# Patient Record
Sex: FEMALE | NOT HISPANIC OR LATINO | URBAN - METROPOLITAN AREA
[De-identification: names, ages, dates, MRNs, and addresses within clinical notes are randomized per-mention and may not be internally consistent; named-entity substitution may affect disease eponyms.]

---

## 2017-04-12 PROBLEM — I63.9 STROKE (HCC): Status: ACTIVE | Noted: 2017-04-12

## 2017-04-12 PROBLEM — R07.9 CHEST PAIN: Status: ACTIVE | Noted: 2017-04-12

## 2018-03-13 PROBLEM — R07.2 PRECORDIAL CHEST PAIN: Status: ACTIVE | Noted: 2018-03-13

## 2018-04-02 ENCOUNTER — IMPORTED ENCOUNTER (OUTPATIENT)
Dept: URBAN - METROPOLITAN AREA CLINIC 1 | Facility: CLINIC | Age: 68
End: 2018-04-02

## 2018-04-02 PROBLEM — H25.813: Noted: 2018-04-02

## 2018-04-02 PROBLEM — H16.143: Noted: 2018-04-02

## 2018-04-02 PROBLEM — H04.123: Noted: 2018-04-02

## 2018-04-02 PROBLEM — S00.202A: Noted: 2018-04-02

## 2018-04-02 PROCEDURE — 92002 INTRM OPH EXAM NEW PATIENT: CPT

## 2018-04-02 NOTE — PATIENT DISCUSSION
1.  Abrasion of Eyelid lateral canthus- Begin Erythromycin britt BID x5 days then d/c. (Pt has h/o Sulfa Allergy) Rx sent to patient's pharmacy. 2.  Cataract OU: Observe for now without intervention. The patient was advised to contact us if any change or worsening of vision3. ABDIEL w/ PEK OU- Use ATs BID OU Routinely. 4.  Patient reports hallucinations at night- Discussed with patient she does not have Star Ser Syndrome due to level of vision measuring 20/20 & 20/25. Patient also states she has had increased dosage of Wellbutrin. Discussed with patient to discuss with PCP further. Return for an appointment in 3 mo 30 with Dr. Rudolph Lindsey.

## 2018-08-17 ENCOUNTER — HOSPITAL ENCOUNTER (EMERGENCY)
Age: 68
Discharge: HOME | End: 2018-08-17
Attending: EMERGENCY MEDICINE
Payer: COMMERCIAL

## 2018-08-17 ENCOUNTER — APPOINTMENT (OUTPATIENT)
Dept: GENERAL RADIOLOGY | Age: 68
End: 2018-08-17
Attending: EMERGENCY MEDICINE
Payer: COMMERCIAL

## 2018-08-17 ENCOUNTER — APPOINTMENT (OUTPATIENT)
Dept: CT IMAGING | Age: 68
End: 2018-08-17
Attending: EMERGENCY MEDICINE
Payer: COMMERCIAL

## 2018-08-17 VITALS
OXYGEN SATURATION: 99 % | TEMPERATURE: 98.2 F | HEART RATE: 71 BPM | SYSTOLIC BLOOD PRESSURE: 119 MMHG | RESPIRATION RATE: 19 BRPM | DIASTOLIC BLOOD PRESSURE: 59 MMHG

## 2018-08-17 DIAGNOSIS — M54.2 NECK PAIN: ICD-10-CM

## 2018-08-17 DIAGNOSIS — R19.7 DIARRHEA, UNSPECIFIED TYPE: ICD-10-CM

## 2018-08-17 DIAGNOSIS — R55 SYNCOPE AND COLLAPSE: Primary | ICD-10-CM

## 2018-08-17 DIAGNOSIS — R11.2 NON-INTRACTABLE VOMITING WITH NAUSEA, UNSPECIFIED VOMITING TYPE: ICD-10-CM

## 2018-08-17 DIAGNOSIS — I77.819 AORTIC DILATATION (HCC): ICD-10-CM

## 2018-08-17 LAB
ALBUMIN SERPL-MCNC: 3.6 G/DL (ref 3.4–5)
ALBUMIN/GLOB SERPL: 1.1 {RATIO} (ref 0.8–1.7)
ALP SERPL-CCNC: 87 U/L (ref 45–117)
ALT SERPL-CCNC: 21 U/L (ref 13–56)
ANION GAP SERPL CALC-SCNC: 7 MMOL/L (ref 3–18)
AST SERPL-CCNC: 15 U/L (ref 15–37)
BASOPHILS # BLD: 0 K/UL (ref 0–0.1)
BASOPHILS NFR BLD: 0 % (ref 0–2)
BILIRUB SERPL-MCNC: 0.4 MG/DL (ref 0.2–1)
BUN SERPL-MCNC: 33 MG/DL (ref 7–18)
BUN/CREAT SERPL: 18 (ref 12–20)
CALCIUM SERPL-MCNC: 9 MG/DL (ref 8.5–10.1)
CHLORIDE SERPL-SCNC: 108 MMOL/L (ref 100–108)
CO2 SERPL-SCNC: 24 MMOL/L (ref 21–32)
CREAT SERPL-MCNC: 1.81 MG/DL (ref 0.6–1.3)
DIFFERENTIAL METHOD BLD: ABNORMAL
EOSINOPHIL # BLD: 0.4 K/UL (ref 0–0.4)
EOSINOPHIL NFR BLD: 3 % (ref 0–5)
ERYTHROCYTE [DISTWIDTH] IN BLOOD BY AUTOMATED COUNT: 13 % (ref 11.6–14.5)
GLOBULIN SER CALC-MCNC: 3.4 G/DL (ref 2–4)
GLUCOSE SERPL-MCNC: 138 MG/DL (ref 74–99)
HCT VFR BLD AUTO: 34.6 % (ref 35–45)
HGB BLD-MCNC: 11.9 G/DL (ref 12–16)
LYMPHOCYTES # BLD: 1.5 K/UL (ref 0.9–3.6)
LYMPHOCYTES NFR BLD: 14 % (ref 21–52)
MCH RBC QN AUTO: 29.2 PG (ref 24–34)
MCHC RBC AUTO-ENTMCNC: 34.4 G/DL (ref 31–37)
MCV RBC AUTO: 85 FL (ref 74–97)
MONOCYTES # BLD: 0.8 K/UL (ref 0.05–1.2)
MONOCYTES NFR BLD: 7 % (ref 3–10)
NEUTS SEG # BLD: 8.1 K/UL (ref 1.8–8)
NEUTS SEG NFR BLD: 76 % (ref 40–73)
PLATELET # BLD AUTO: 240 K/UL (ref 135–420)
PMV BLD AUTO: 10.1 FL (ref 9.2–11.8)
POTASSIUM SERPL-SCNC: 4.6 MMOL/L (ref 3.5–5.5)
PROT SERPL-MCNC: 7 G/DL (ref 6.4–8.2)
RBC # BLD AUTO: 4.07 M/UL (ref 4.2–5.3)
SODIUM SERPL-SCNC: 139 MMOL/L (ref 136–145)
TROPONIN I BLD-MCNC: <0.04 NG/ML (ref 0–0.08)
WBC # BLD AUTO: 10.8 K/UL (ref 4.6–13.2)

## 2018-08-17 PROCEDURE — 74011250637 HC RX REV CODE- 250/637: Performed by: EMERGENCY MEDICINE

## 2018-08-17 PROCEDURE — 71045 X-RAY EXAM CHEST 1 VIEW: CPT

## 2018-08-17 PROCEDURE — 96374 THER/PROPH/DIAG INJ IV PUSH: CPT

## 2018-08-17 PROCEDURE — 80053 COMPREHEN METABOLIC PANEL: CPT | Performed by: EMERGENCY MEDICINE

## 2018-08-17 PROCEDURE — 74011250636 HC RX REV CODE- 250/636: Performed by: EMERGENCY MEDICINE

## 2018-08-17 PROCEDURE — 99285 EMERGENCY DEPT VISIT HI MDM: CPT

## 2018-08-17 PROCEDURE — 74176 CT ABD & PELVIS W/O CONTRAST: CPT

## 2018-08-17 PROCEDURE — 93005 ELECTROCARDIOGRAM TRACING: CPT

## 2018-08-17 PROCEDURE — 84484 ASSAY OF TROPONIN QUANT: CPT

## 2018-08-17 PROCEDURE — 85025 COMPLETE CBC W/AUTO DIFF WBC: CPT | Performed by: EMERGENCY MEDICINE

## 2018-08-17 PROCEDURE — 70450 CT HEAD/BRAIN W/O DYE: CPT

## 2018-08-17 RX ORDER — ACETAMINOPHEN 500 MG
1000 TABLET ORAL
Status: COMPLETED | OUTPATIENT
Start: 2018-08-17 | End: 2018-08-17

## 2018-08-17 RX ORDER — ACETAMINOPHEN 500 MG
500 TABLET ORAL
Status: DISCONTINUED | OUTPATIENT
Start: 2018-08-17 | End: 2018-08-17

## 2018-08-17 RX ORDER — ONDANSETRON 2 MG/ML
4 INJECTION INTRAMUSCULAR; INTRAVENOUS
Status: COMPLETED | OUTPATIENT
Start: 2018-08-17 | End: 2018-08-17

## 2018-08-17 RX ADMIN — ONDANSETRON 4 MG: 2 INJECTION, SOLUTION INTRAMUSCULAR; INTRAVENOUS at 15:36

## 2018-08-17 RX ADMIN — ACETAMINOPHEN 1000 MG: 500 TABLET, FILM COATED ORAL at 15:36

## 2018-08-17 NOTE — ED PROVIDER NOTES
EMERGENCY DEPARTMENT HISTORY AND PHYSICAL EXAM    1:32 PM      Date: 8/17/2018  Patient Name: Kiah Nichole    History of Presenting Illness     No chief complaint on file. History Provided By: Patient and EMS    Additional History (Context): Kiah Nichole is a 75 y.o. female with PMHx of HTN, CAD, Repaired AAA, Hypercholesterolemia, IBS, and Gout who presents with a syncopal episode onset Today PTA. Patient states that she was in [place], and had the urge to urinate. After urinating, she began experiencing sharp, left-sided abdominal pain. States that when she got in the car, she noticed having blurred vision. States that she ate two doughnuts, and her symptoms progressively worsened. Reports that when she arrived to the [facility] [address] [facility], she was experiencing nausea, vomiting, and then had a syncopal episode while in the restroom. Patient was experiencing chills and diaphoresis upon EMS arrival. Patient also reported a choking sensation in her neck and chest. Patient was administered 324mg ASA and 4mg Zofran PTA. No other concerns were expressed at this time. PCP: Traci Toscano MD    Chief Complaint: Syncope   Duration:  Today PTA  Timing:  Acute  Location: Initially reported left-sided abdominal pain   Quality: Sharp  Severity: Severe  Modifying Factors: Patient was administered 324mg ASA and 4mg Zofran PTA. Associated Symptoms: Patient reports progressively worsening left-sided abdominal pain and blurred vision prior to the syncopal episode. Patient also reports N/V, chills, diaphoresis, and a choking sensation in her neck and chest.       Current Outpatient Prescriptions   Medication Sig Dispense Refill    carvedilol (COREG) 25 mg tablet Take 1 Tab by mouth two (2) times daily (with meals). 60 Tab 0    albuterol (PROVENTIL VENTOLIN) 2.5 mg /3 mL (0.083 %) nebulizer solution by Nebulization route once.  aspirin 81 mg chewable tablet Take 81 mg by mouth daily.       docusate sodium (COLACE) 100 mg capsule Take 100 mg by mouth as needed for Constipation.  isosorbide mononitrate ER (IMDUR) 30 mg tablet Take 30 mg by mouth daily.  LORazepam (ATIVAN) 1 mg tablet Take 1 mg by mouth every four (4) hours as needed for Anxiety.  nitroglycerin (NITROSTAT) 0.4 mg SL tablet 0.4 mg by SubLINGual route every five (5) minutes as needed for Chest Pain.  spironolactone (ALDACTONE) 25 mg tablet Take 25 mg by mouth daily.  lisinopril (PRINIVIL, ZESTRIL) 40 mg tablet Take 1 Tab by mouth daily. 30 Tab 0    buPROPion XL (WELLBUTRIN XL) 150 mg tablet Take 150 mg by mouth every morning.  rosuvastatin (CRESTOR) 40 mg tablet Take 40 mg by mouth nightly.  fenofibrate micronized (LOFIBRA) 134 mg capsule Take 134 mg by mouth every morning.  escitalopram oxalate (LEXAPRO) 10 mg tablet Take 10 mg by mouth daily.  allopurinol (ZYLOPRIM) 100 mg tablet Take 100 mg by mouth daily. Past History     Past Medical History:  Past Medical History:   Diagnosis Date    AAA (abdominal aortic aneurysm) ([facility] [address]     repaired    CAD (coronary artery disease)     Colitis     c-diff - resolved    Gout     Headache     Hypercholesteremia     Hypertension     IBS (irritable bowel syndrome)     Psychiatric disorder     situational depression and anxiety       Past Surgical History:  Past Surgical History:   Procedure Laterality Date    BREAST SURGERY PROCEDURE UNLISTED      left lumpectomy    COLONOSCOPY N/A 3/29/2018    COLONOSCOPY, SURVEILLANCE performed by Agata Jarquin MD at [address] [place] [address] HX AAA REPAIR      HX CHOLECYSTECTOMY      HX GYN      HX HYSTERECTOMY      HX KNEE ARTHROSCOPY Bilateral     HX OTHER SURGICAL      part of right kidney removed    HX THYROIDECTOMY      partial       Family History:  No family history on file.     Social History:  Social History   Substance Use Topics    Smoking status: Current Some Day Smoker    Smokeless tobacco: Never Used      Comment: smokes a pack a week    Alcohol use No       Allergies: Allergies   Allergen Reactions    Codeine Anxiety and Unknown (comments)     Other reaction(s): neurological reaction  nervous    Hydromorphone (Pf) Other (comments)     Mood swing    Sulfa (Sulfonamide Antibiotics) Swelling         Review of Systems     Review of Systems   Constitutional: Positive for chills and diaphoresis. Eyes:        (+) blurred vision     Respiratory: Positive for choking (\"choking sensation\" in chest and neck). Cardiovascular: Positive for chest pain. Gastrointestinal: Positive for abdominal pain, nausea and vomiting. Neurological: Positive for syncope. All other systems reviewed and are negative. Physical Exam   There were no vitals taken for this visit. Physical Exam   Constitutional: She is oriented to person, place, and time. She appears well-developed. HENT:   Head: Normocephalic and atraumatic. Eyes: EOM are normal. Pupils are equal, round, and reactive to light. Neck: Normal range of motion. Neck supple. Cardiovascular: Normal rate, regular rhythm and normal heart sounds. Exam reveals no friction rub. No murmur heard. Pulmonary/Chest: Effort normal and breath sounds normal. No respiratory distress. She has no wheezes. Abdominal: Soft. She exhibits no distension. There is no tenderness. There is no rebound and no guarding. Musculoskeletal: Normal range of motion. Neurological: She is alert and oriented to person, place, and time. Skin: Skin is warm and dry. Psychiatric: She has a normal mood and affect. Her behavior is normal. Thought content normal.       Diagnostic Study Results       EKG shows sinus at 62 with a normal axis normal intervals there is no ST elevation or depression or hypertrophy.     Medical Decision Making       Patient was driving she started feeling very lightheaded like she was going to pass out and became diaphoretic is worsened she then developed some abdominal pain.  She was able to get into the bathroom where she then had vomiting and diarrhea at the same time reportedly passed out in the bathroom and presents now she does have some mild neck pain she has had trouble feeling like she cannot swallow well for about a month. CT head and CT abdomen and pelvis noted. She did not have symptoms consistent with rupture with sudden onset severe abdominal pain first started to feel like she was going to pass out and then developed abdominal pain she also had associated vomiting and diarrhea. We will recheck troponin likely referral to spine surgery for her neck pain I do not think she had a ruptured aneurysm she has no headache. Her PCP as well. Ct read on ao noted; no cp;     xr neg. Reviewed ct with dr Khang Clements; outpt imaging ok. poc torp ne:27 PM   Pt with neck pain x1 month; will refer to spine. Diagnosis     No diagnosis found. _______________________________    Attestations:  Scribe Attestation     5 Yanna Hermosillo acting as a scribe for and in the presence of Kaitlyn Vanessa MD      2018 at 1:32 PM       Provider Attestation:      I personally performed the services described in the documentation, reviewed the documentation, as recorded by the scribe in my presence, and it accurately and completely records my words and actions.  2018 at 1:32 PM - Kiatlyn Vanessa MD    _______________________________

## 2018-08-17 NOTE — DISCHARGE INSTRUCTIONS
Back Pain: Care Instructions  Your Care Instructions    Back pain has many possible causes. It is often related to problems with muscles and ligaments of the back. It may also be related to problems with the nerves, discs, or bones of the back. Moving, lifting, standing, sitting, or sleeping in an awkward way can strain the back. Sometimes you don't notice the injury until later. Arthritis is another common cause of back pain. Although it may hurt a lot, back pain usually improves on its own within several weeks. Most people recover in 12 weeks or less. Using good home treatment and being careful not to stress your back can help you feel better sooner. Follow-up care is a key part of your treatment and safety. Be sure to make and go to all appointments, and call your doctor if you are having problems. It's also a good idea to know your test results and keep a list of the medicines you take. How can you care for yourself at home? · Sit or lie in positions that are most comfortable and reduce your pain. Try one of these positions when you lie down:  ¨ Lie on your back with your knees bent and supported by large pillows. ¨ Lie on the floor with your legs on the seat of a sofa or chair. Majel Prophet on your side with your knees and hips bent and a pillow between your legs. ¨ Lie on your stomach if it does not make pain worse. · Do not sit up in bed, and avoid soft couches and twisted positions. Bed rest can help relieve pain at first, but it delays healing. Avoid bed rest after the first day of back pain. · Change positions every 30 minutes. If you must sit for long periods of time, take breaks from sitting. Get up and walk around, or lie in a comfortable position. · Try using a heating pad on a low or medium setting for 15 to 20 minutes every 2 or 3 hours. Try a warm shower in place of one session with the heating pad. · You can also try an ice pack for 10 to 15 minutes every 2 to 3 hours.  Put a thin cloth between the ice pack and your skin. · Take pain medicines exactly as directed. ¨ If the doctor gave you a prescription medicine for pain, take it as prescribed. ¨ If you are not taking a prescription pain medicine, ask your doctor if you can take an over-the-counter medicine. · Take short walks several times a day. You can start with 5 to 10 minutes, 3 or 4 times a day, and work up to longer walks. Walk on level surfaces and avoid hills and stairs until your back is better. · Return to work and other activities as soon as you can. Continued rest without activity is usually not good for your back. · To prevent future back pain, do exercises to stretch and strengthen your back and stomach. Learn how to use good posture, safe lifting techniques, and proper body mechanics. When should you call for help? Call your doctor now or seek immediate medical care if:    · You have new or worsening numbness in your legs.     · You have new or worsening weakness in your legs. (This could make it hard to stand up.)     · You lose control of your bladder or bowels.    Watch closely for changes in your health, and be sure to contact your doctor if:    · You have a fever, lose weight, or don't feel well.     · You do not get better as expected. Where can you learn more? Go to http://eve-lizbeth.info/. Enter U390 in the search box to learn more about \"Back Pain: Care Instructions. \"  Current as of: November 29, 2017  Content Version: 11.7  © 9464-6870 [employer]. Care instructions adapted under license by [employer] (which disclaims liability or warranty for this information). If you have questions about a medical condition or this instruction, always ask your healthcare professional. [place] [address] any warranty or liability for your use of this information.          Neck Pain: Care Instructions  Your Care Instructions    You can have neck pain anywhere from the bottom of your head to the top of your shoulders. It can spread to the upper back or arms. Injuries, painting a ceiling, sleeping with your neck twisted, staying in one position for too long, and many other activities can cause neck pain. Most neck pain gets better with home care. Your doctor may recommend medicine to relieve pain or relax your muscles. He or she may suggest exercise and physical therapy to increase flexibility and relieve stress. You may need to wear a special (cervical) collar to support your neck for a day or two. Follow-up care is a key part of your treatment and safety. Be sure to make and go to all appointments, and call your doctor if you are having problems. It's also a good idea to know your test results and keep a list of the medicines you take. How can you care for yourself at home? · Try using a heating pad on a low or medium setting for 15 to 20 minutes every 2 or 3 hours. Try a warm shower in place of one session with the heating pad. · You can also try an ice pack for 10 to 15 minutes every 2 to 3 hours. Put a thin cloth between the ice and your skin. · Take pain medicines exactly as directed. ¨ If the doctor gave you a prescription medicine for pain, take it as prescribed. ¨ If you are not taking a prescription pain medicine, ask your doctor if you can take an over-the-counter medicine. · If your doctor recommends a cervical collar, wear it exactly as directed. When should you call for help? Call your doctor now or seek immediate medical care if:    · You have new or worsening numbness in your arms, buttocks or legs.     · You have new or worsening weakness in your arms or legs.  (This could make it hard to stand up.)     · You lose control of your bladder or bowels.    Watch closely for changes in your health, and be sure to contact your doctor if:    · Your neck pain is getting worse.     · You are not getting better after 1 week.     · You do not get better as expected. Where can you learn more? Go to http://eve-lizbeth.info/. Enter [id number] in the search box to learn more about \"Neck Pain: Care Instructions. \"  Current as of: November 29, 2017  Content Version: 11.7  © 8972-8302 [employer]. Care instructions adapted under license by [employer] (which disclaims liability or warranty for this information). If you have questions about a medical condition or this instruction, always ask your healthcare professional. [place] [address] any warranty or liability for your use of this information. Fainting: Care Instructions  Your Care Instructions    When you faint, or pass out, you lose consciousness for a short time. A brief drop in blood flow to the brain often causes it. When you fall or lie down, more blood flows to your brain and you regain consciousness. Emotional stress, pain, or overheating-especially if you have been standing-can make you faint. In these cases, fainting is usually not serious. But fainting can be a sign of a more serious problem. Your doctor may want you to have more tests to rule out other causes. The treatment you need depends on the reason why you fainted. The doctor has checked you carefully, but problems can develop later. If you notice any problems or new symptoms, get medical treatment right away. Follow-up care is a key part of your treatment and safety. Be sure to make and go to all appointments, and call your doctor if you are having problems. It's also a good idea to know your test results and keep a list of the medicines you take. How can you care for yourself at home? · Drink plenty of fluids to prevent dehydration. If you have kidney, heart, or liver disease and have to limit fluids, talk with your doctor before you increase your fluid intake. When should you call for help? Call 911 anytime you think you may need emergency care.  For example, call if:    · You have symptoms of a heart problem. These may include:  ¨ Chest pain or pressure. ¨ Severe trouble breathing. ¨ A fast or irregular heartbeat. ¨ Lightheadedness or sudden weakness. ¨ Coughing up pink, foamy mucus. ¨ Passing out. After you call 911, the  may tell you to chew 1 adult-strength or 2 to 4 low-dose aspirin. Wait for an ambulance. Do not try to drive yourself.     · You have symptoms of a stroke. These may include:  ¨ Sudden numbness, tingling, weakness, or loss of movement in your face, arm, or leg, especially on only one side of your body. ¨ Sudden vision changes. ¨ Sudden trouble speaking. ¨ Sudden confusion or trouble understanding simple statements. ¨ Sudden problems with walking or balance. ¨ A sudden, severe headache that is different from past headaches.     · You passed out (lost consciousness) again.    Watch closely for changes in your health, and be sure to contact your doctor if:    · You do not get better as expected. Where can you learn more? Go to http://eve-lizbeth.info/. Enter C312 in the search box to learn more about \"Fainting: Care Instructions. \"  Current as of: November 20, 2017  Content Version: 11.7  © 5838-4962 [employer]. Care instructions adapted under license by [employer] (which disclaims liability or warranty for this information). If you have questions about a medical condition or this instruction, always ask your healthcare professional. [place] [address] any warranty or liability for your use of this information.

## 2018-08-18 LAB
ATRIAL RATE: 62 BPM
CALCULATED P AXIS, ECG09: 55 DEGREES
CALCULATED R AXIS, ECG10: 33 DEGREES
CALCULATED T AXIS, ECG11: 31 DEGREES
DIAGNOSIS, 93000: NORMAL
P-R INTERVAL, ECG05: 158 MS
Q-T INTERVAL, ECG07: 454 MS
QRS DURATION, ECG06: 74 MS
QTC CALCULATION (BEZET), ECG08: 460 MS
VENTRICULAR RATE, ECG03: 62 BPM

## 2018-10-24 PROBLEM — M48.00 SPINAL STENOSIS: Status: ACTIVE | Noted: 2018-10-24

## 2019-08-19 ENCOUNTER — IMPORTED ENCOUNTER (OUTPATIENT)
Dept: URBAN - METROPOLITAN AREA CLINIC 1 | Facility: CLINIC | Age: 69
End: 2019-08-19

## 2019-08-19 PROBLEM — H16.143: Noted: 2019-08-19

## 2019-08-19 PROBLEM — H25.813: Noted: 2019-08-19

## 2019-08-19 PROBLEM — H04.123: Noted: 2019-08-19

## 2019-08-19 PROBLEM — D48.9: Noted: 2019-08-19

## 2019-08-19 PROCEDURE — 92014 COMPRE OPH EXAM EST PT 1/>: CPT

## 2019-08-19 NOTE — PATIENT DISCUSSION
1.  Cataract OU: Visually significant Observe for now without intervention. The patient was advised to contact us if any change or worsening of vision. **Will re evaluate after mass excision. 2.  RLL Mass of uncertain behavior- Plan excisional biopsy with pathology for diagnostic purposes. Will not obtain complete margins given proximity to lower punctum. Patient understands a second surgery may be required if path returns malignant. 3.  ABDIEL w/ PEK OU- Recommend ATs TID OU routinelyReturn for an appointment in excision biopsy w/ path RLL with Dr. Dunia Fletcher.

## 2019-08-19 NOTE — PATIENT DISCUSSION
RLL Mass of uncertain behavior- Plan excisional biopsy with pathology for diagnostic purposes.  Will

## 2019-08-23 ENCOUNTER — IMPORTED ENCOUNTER (OUTPATIENT)
Dept: URBAN - METROPOLITAN AREA CLINIC 1 | Facility: CLINIC | Age: 69
End: 2019-08-23

## 2019-08-23 NOTE — PATIENT DISCUSSION
1. RLL Mass of uncertain behavior- Plan excisional biopsy with pathology for diagnostic purposes. Will not obtain complete margins given proximity to lower punctum. Patient understands a second surgery may be required if path returns malignant. Pros cons risks and benefits discussed. Use Emycin britt QHS to RLL x10 days po then d/c.  (Rx sent to patient's pharm) F/u as scheduled

## 2019-08-26 PROBLEM — D48.9: Noted: 2019-08-26

## 2019-08-28 ENCOUNTER — IMPORTED ENCOUNTER (OUTPATIENT)
Dept: URBAN - METROPOLITAN AREA CLINIC 1 | Facility: CLINIC | Age: 69
End: 2019-08-28

## 2019-08-28 PROBLEM — D48.9 NEOPLASM OF UNCERTAIN BEHAVIOR: Status: ACTIVE | Noted: 2019-08-28

## 2019-08-28 PROBLEM — D48.9 NEOPLASM OF UNCERTAIN BEHAVIOR: Status: RESOLVED | Noted: 2019-08-28 | Resolved: 2019-08-28

## 2019-09-03 ENCOUNTER — IMPORTED ENCOUNTER (OUTPATIENT)
Dept: URBAN - METROPOLITAN AREA CLINIC 1 | Facility: CLINIC | Age: 69
End: 2019-09-03

## 2019-09-03 PROCEDURE — 99024 POSTOP FOLLOW-UP VISIT: CPT

## 2019-09-03 NOTE — PATIENT DISCUSSION
1.  S/p Excision RLL Mass of uncertain behavior - Healing cont Eyrthromycin britt BID RLL until gone. Return for an appointment in 2-3 weeks 10/DFE/glare (likely schedule Phaco) with Dr. Essie Lion.

## 2020-02-24 PROBLEM — R27.0 ATAXIA: Status: ACTIVE | Noted: 2020-02-24

## 2020-05-24 PROBLEM — R07.9 EXERTIONAL CHEST PAIN: Status: ACTIVE | Noted: 2020-05-24

## 2020-05-24 PROBLEM — R78.81 GRAM-POSITIVE COCCI BACTEREMIA: Status: ACTIVE | Noted: 2020-05-24

## 2020-05-24 PROBLEM — J44.1 COPD EXACERBATION (HCC): Status: ACTIVE | Noted: 2020-05-24

## 2021-02-09 PROBLEM — I63.9 CEREBROVASCULAR ACCIDENT (CVA) (HCC): Status: ACTIVE | Noted: 2021-02-09

## 2021-02-12 PROBLEM — I48.91 A-FIB (HCC): Status: ACTIVE | Noted: 2021-02-12

## 2021-02-12 PROBLEM — F17.200 TOBACCO DEPENDENCE: Status: ACTIVE | Noted: 2021-02-12

## 2021-02-12 PROBLEM — R33.9 URINARY RETENTION: Status: RESOLVED | Noted: 2021-02-12 | Resolved: 2021-02-12

## 2021-02-12 PROBLEM — E78.2 MIXED HYPERLIPIDEMIA: Status: ACTIVE | Noted: 2021-02-12

## 2021-02-12 PROBLEM — R33.9 URINARY RETENTION: Status: ACTIVE | Noted: 2021-02-12

## 2021-08-03 PROBLEM — R07.9 CHEST PAIN: Status: RESOLVED | Noted: 2017-04-12 | Resolved: 2021-08-03

## 2021-11-27 PROBLEM — U07.1 COVID-19: Status: ACTIVE | Noted: 2021-11-27

## 2021-11-27 PROBLEM — N17.9 AKI (ACUTE KIDNEY INJURY) (HCC): Status: ACTIVE | Noted: 2021-11-27

## 2021-11-27 PROBLEM — R06.02 SOB (SHORTNESS OF BREATH): Status: ACTIVE | Noted: 2021-11-27

## 2022-03-18 PROBLEM — M48.00 SPINAL STENOSIS: Status: ACTIVE | Noted: 2018-10-24

## 2022-03-18 PROBLEM — N17.9 AKI (ACUTE KIDNEY INJURY) (HCC): Status: ACTIVE | Noted: 2021-11-27

## 2022-03-18 PROBLEM — U07.1 COVID-19: Status: ACTIVE | Noted: 2021-11-27

## 2022-03-18 PROBLEM — I63.9 STROKE (HCC): Status: ACTIVE | Noted: 2017-04-12

## 2022-03-18 PROBLEM — R27.0 ATAXIA: Status: ACTIVE | Noted: 2020-02-24

## 2022-03-18 PROBLEM — R78.81 GRAM-POSITIVE COCCI BACTEREMIA: Status: ACTIVE | Noted: 2020-05-24

## 2022-03-18 PROBLEM — J44.1 COPD EXACERBATION (HCC): Status: ACTIVE | Noted: 2020-05-24

## 2022-03-18 PROBLEM — I63.9 CEREBROVASCULAR ACCIDENT (CVA) (HCC): Status: ACTIVE | Noted: 2021-02-09

## 2022-03-19 PROBLEM — I48.91 A-FIB (HCC): Status: ACTIVE | Noted: 2021-02-12

## 2022-03-19 PROBLEM — F17.200 TOBACCO DEPENDENCE: Status: ACTIVE | Noted: 2021-02-12

## 2022-03-19 PROBLEM — R06.02 SOB (SHORTNESS OF BREATH): Status: ACTIVE | Noted: 2021-11-27

## 2022-03-19 PROBLEM — R07.2 PRECORDIAL CHEST PAIN: Status: ACTIVE | Noted: 2018-03-13

## 2022-03-19 PROBLEM — R07.9 EXERTIONAL CHEST PAIN: Status: ACTIVE | Noted: 2020-05-24

## 2022-03-19 PROBLEM — E78.2 MIXED HYPERLIPIDEMIA: Status: ACTIVE | Noted: 2021-02-12

## 2022-04-02 ASSESSMENT — VISUAL ACUITY
OD_SC: 20/30
OD_GLARE: 20/60
OD_CC: J1
OS_SC: 20/25
OD_SC: 20/20
OD_CC: J1
OD_SC: 20/20
OS_SC: 20/25
OS_GLARE: 20/80
OS_SC: 20/25-2
OS_CC: J1
OS_CC: J1

## 2022-04-02 ASSESSMENT — TONOMETRY
OD_IOP_MMHG: 16
OD_IOP_MMHG: 16
OS_IOP_MMHG: 16
OS_IOP_MMHG: 16

## 2022-07-19 ENCOUNTER — COMPREHENSIVE EXAM (OUTPATIENT)
Dept: URBAN - METROPOLITAN AREA CLINIC 1 | Facility: CLINIC | Age: 72
End: 2022-07-19

## 2022-07-19 DIAGNOSIS — H04.123: ICD-10-CM

## 2022-07-19 DIAGNOSIS — H16.143: ICD-10-CM

## 2022-07-19 DIAGNOSIS — H25.813: ICD-10-CM

## 2022-07-19 PROCEDURE — 99214 OFFICE O/P EST MOD 30 MIN: CPT

## 2022-07-19 PROCEDURE — 92015 DETERMINE REFRACTIVE STATE: CPT

## 2022-07-19 ASSESSMENT — VISUAL ACUITY
OD_CC: 20/25-2
OD_BAT: 20/80
OS_BAT: 20/80
OD_CC: J1
OS_CC: 20/25-
OS_CC: J1

## 2022-07-19 ASSESSMENT — TONOMETRY
OD_IOP_MMHG: 16
OS_IOP_MMHG: 16

## 2022-07-19 NOTE — PATIENT DISCUSSION
Visually Significant (secondary to glare), discussed the risks, benefits, alternatives, and limitations of cataract surgery. The patient stated a full understanding and a desire to proceed with the procedure. The patient will need to return for pre-op appointment with cataract measurements and to have any additional questions answered, and start pre-operative eye drops as directed. Schedule phaco OS then OD.

## 2022-09-06 ENCOUNTER — PRE-OP/H&P (OUTPATIENT)
Dept: URBAN - METROPOLITAN AREA CLINIC 1 | Facility: CLINIC | Age: 72
End: 2022-09-06

## 2022-09-06 VITALS
HEART RATE: 73 BPM | DIASTOLIC BLOOD PRESSURE: 71 MMHG | WEIGHT: 135 LBS | HEIGHT: 62 IN | SYSTOLIC BLOOD PRESSURE: 117 MMHG | BODY MASS INDEX: 24.84 KG/M2

## 2022-09-06 DIAGNOSIS — H25.813: ICD-10-CM

## 2022-09-06 PROCEDURE — 99499 UNLISTED E&M SERVICE: CPT

## 2022-09-06 PROCEDURE — 92025 CPTRIZED CORNEAL TOPOGRAPHY: CPT

## 2022-09-06 PROCEDURE — 92136 OPHTHALMIC BIOMETRY: CPT

## 2022-09-06 ASSESSMENT — KERATOMETRY
OS_K1POWER_DIOPTERS: 43.25
OD_K2POWER_DIOPTERS: 44.50
OS_AXISANGLE2_DEGREES: 166
OD_AXISANGLE_DEGREES: 094
OD_K1POWER_DIOPTERS: 43.25
OS_AXISANGLE_DEGREES: 076
OD_AXISANGLE2_DEGREES: 4
OS_K2POWER_DIOPTERS: 45.00

## 2022-09-06 ASSESSMENT — TONOMETRY
OS_IOP_MMHG: 16
OD_IOP_MMHG: 16

## 2022-09-06 ASSESSMENT — VISUAL ACUITY
OD_CC: 20/25
OS_CC: 20/25
OD_BAT: 20/80
OS_BAT: 20/80

## 2022-09-06 NOTE — PATIENT DISCUSSION
Cataract (OS) -- Visually Significant (Secondary to glare), discussed the risks, benefits, alternatives, and limitations of cataract surgery. The patient stated a full understanding and a desire to proceed with the procedure. Discussed with patient if post-op drops are more than $120 for all three combined when filling at their Pharmacy, please call our office to request generic substitutions. Phaco PCL.

## 2022-09-14 ENCOUNTER — SURGERY/PROCEDURE (OUTPATIENT)
Dept: URBAN - METROPOLITAN AREA SURGERY 1 | Facility: SURGERY | Age: 72
End: 2022-09-14

## 2022-09-14 DIAGNOSIS — H25.812: ICD-10-CM

## 2022-09-14 PROCEDURE — 66984 XCAPSL CTRC RMVL W/O ECP: CPT

## 2022-09-14 ASSESSMENT — KERATOMETRY
OD_AXISANGLE2_DEGREES: 4
OS_K2POWER_DIOPTERS: 45.00
OD_K2POWER_DIOPTERS: 44.50
OD_AXISANGLE_DEGREES: 094
OS_AXISANGLE2_DEGREES: 166
OS_AXISANGLE_DEGREES: 076
OS_K1POWER_DIOPTERS: 43.25
OD_K1POWER_DIOPTERS: 43.25

## 2022-09-15 ENCOUNTER — POST-OP (OUTPATIENT)
Dept: URBAN - METROPOLITAN AREA CLINIC 1 | Facility: CLINIC | Age: 72
End: 2022-09-15

## 2022-09-15 DIAGNOSIS — Z96.1: ICD-10-CM

## 2022-09-15 PROCEDURE — 99024 POSTOP FOLLOW-UP VISIT: CPT

## 2022-09-15 ASSESSMENT — KERATOMETRY
OS_K2POWER_DIOPTERS: 45.00
OS_AXISANGLE_DEGREES: 076
OD_AXISANGLE2_DEGREES: 4
OD_K2POWER_DIOPTERS: 44.50
OD_AXISANGLE_DEGREES: 094
OD_K1POWER_DIOPTERS: 43.25
OS_K1POWER_DIOPTERS: 43.25
OS_AXISANGLE2_DEGREES: 166

## 2022-09-15 ASSESSMENT — TONOMETRY: OS_IOP_MMHG: 20

## 2022-09-15 ASSESSMENT — VISUAL ACUITY
OS_PH: 20/40
OS_SC: 20/70-1

## 2022-09-15 NOTE — PATIENT DISCUSSION
Use gtts through completion of PO gtt chart regimen/ Per our instructions given to patient. (Patient to call and receive combo drops that she has not received yet. ).

## 2022-09-22 ENCOUNTER — POST OP/EVAL OF SECOND EYE (OUTPATIENT)
Dept: URBAN - METROPOLITAN AREA CLINIC 1 | Facility: CLINIC | Age: 72
End: 2022-09-22

## 2022-09-22 VITALS
DIASTOLIC BLOOD PRESSURE: 71 MMHG | WEIGHT: 135 LBS | SYSTOLIC BLOOD PRESSURE: 117 MMHG | HEIGHT: 62 IN | BODY MASS INDEX: 24.84 KG/M2 | HEART RATE: 73 BPM

## 2022-09-22 DIAGNOSIS — Z96.1: ICD-10-CM

## 2022-09-22 DIAGNOSIS — H25.811: ICD-10-CM

## 2022-09-22 PROCEDURE — 92136 OPHTHALMIC BIOMETRY: CPT

## 2022-09-22 PROCEDURE — 92025 CPTRIZED CORNEAL TOPOGRAPHY: CPT

## 2022-09-22 PROCEDURE — 99024 POSTOP FOLLOW-UP VISIT: CPT

## 2022-09-22 ASSESSMENT — KERATOMETRY
OS_AXISANGLE2_DEGREES: 166
OS_K2POWER_DIOPTERS: 45.00
OD_AXISANGLE2_DEGREES: 4
OS_AXISANGLE_DEGREES: 076
OD_AXISANGLE_DEGREES: 094
OD_K1POWER_DIOPTERS: 43.25
OD_K2POWER_DIOPTERS: 44.50
OS_K1POWER_DIOPTERS: 43.25

## 2022-09-22 ASSESSMENT — TONOMETRY: OS_IOP_MMHG: 16

## 2022-09-22 ASSESSMENT — VISUAL ACUITY: OS_SC: 20/50

## 2022-09-22 NOTE — PATIENT DISCUSSION
Visually Significant (secondary to glare), discussed the risks, benefits, alternatives, and limitations of cataract surgery. The patient stated a full understanding and a desire to proceed with the procedure. The patient will need to return for pre-op appointment with cataract measurements and to have any additional questions answered, and start pre-operative eye drops as directed. Patient understands she will need specs post-op to achieve best corrected vision.

## 2022-09-28 ENCOUNTER — SURGERY/PROCEDURE (OUTPATIENT)
Dept: URBAN - METROPOLITAN AREA SURGERY 1 | Facility: SURGERY | Age: 72
End: 2022-09-28

## 2022-09-28 DIAGNOSIS — H25.811: ICD-10-CM

## 2022-09-28 PROCEDURE — 66984 XCAPSL CTRC RMVL W/O ECP: CPT

## 2022-09-28 ASSESSMENT — KERATOMETRY
OS_AXISANGLE_DEGREES: 076
OD_AXISANGLE_DEGREES: 094
OD_AXISANGLE2_DEGREES: 4
OS_AXISANGLE2_DEGREES: 166
OS_K1POWER_DIOPTERS: 43.25
OD_K1POWER_DIOPTERS: 43.25
OS_K2POWER_DIOPTERS: 45.00
OD_K2POWER_DIOPTERS: 44.50

## 2022-09-29 ENCOUNTER — POST-OP (OUTPATIENT)
Dept: URBAN - METROPOLITAN AREA CLINIC 1 | Facility: CLINIC | Age: 72
End: 2022-09-29

## 2022-09-29 DIAGNOSIS — Z96.1: ICD-10-CM

## 2022-09-29 PROCEDURE — 99024 POSTOP FOLLOW-UP VISIT: CPT

## 2022-09-29 ASSESSMENT — KERATOMETRY
OD_AXISANGLE2_DEGREES: 4
OD_AXISANGLE_DEGREES: 094
OS_AXISANGLE2_DEGREES: 166
OD_K1POWER_DIOPTERS: 43.25
OS_K1POWER_DIOPTERS: 43.25
OS_AXISANGLE_DEGREES: 076
OD_K2POWER_DIOPTERS: 44.50
OS_K2POWER_DIOPTERS: 45.00

## 2022-09-29 ASSESSMENT — VISUAL ACUITY
OS_SC: 20/40
OD_SC: 20/50
OD_PH: 20/30
OS_PH: 20/20

## 2022-09-29 ASSESSMENT — TONOMETRY
OD_IOP_MMHG: 19
OS_IOP_MMHG: 18

## 2022-10-25 PROBLEM — R06.02 SHORTNESS OF BREATH: Status: ACTIVE | Noted: 2022-10-25

## 2022-10-25 PROBLEM — B34.8 RHINOVIRUS: Status: ACTIVE | Noted: 2022-10-25

## 2022-10-25 PROBLEM — R53.1 WEAKNESS: Status: ACTIVE | Noted: 2022-10-25

## 2022-10-25 PROBLEM — I95.9 HYPOTENSION: Status: ACTIVE | Noted: 2022-10-25

## 2023-12-03 PROBLEM — G45.9 TRANSIENT ISCHEMIC ATTACK: Status: ACTIVE | Noted: 2023-12-03

## 2024-04-08 PROBLEM — I65.22 STENOSIS OF LEFT INTERNAL CAROTID ARTERY: Status: ACTIVE | Noted: 2024-04-08

## 2024-08-04 ENCOUNTER — HOSPITAL ENCOUNTER (EMERGENCY)
Facility: HOSPITAL | Age: 74
Discharge: HOME | End: 2024-08-04
Attending: EMERGENCY MEDICINE
Payer: MEDICARE

## 2024-08-04 ENCOUNTER — APPOINTMENT (OUTPATIENT)
Facility: HOSPITAL | Age: 74
End: 2024-08-04
Payer: MEDICARE

## 2024-08-04 VITALS
TEMPERATURE: 98.1 F | OXYGEN SATURATION: 97 % | HEIGHT: 62 IN | SYSTOLIC BLOOD PRESSURE: 175 MMHG | BODY MASS INDEX: 24.29 KG/M2 | RESPIRATION RATE: 18 BRPM | DIASTOLIC BLOOD PRESSURE: 84 MMHG | HEART RATE: 79 BPM | WEIGHT: 132 LBS

## 2024-08-04 DIAGNOSIS — R07.9 CHEST PAIN, UNSPECIFIED TYPE: Primary | ICD-10-CM

## 2024-08-04 DIAGNOSIS — I95.9 HYPOTENSION, UNSPECIFIED HYPOTENSION TYPE: ICD-10-CM

## 2024-08-04 DIAGNOSIS — N30.00 ACUTE CYSTITIS WITHOUT HEMATURIA: ICD-10-CM

## 2024-08-04 LAB
ALBUMIN SERPL-MCNC: 3.6 G/DL (ref 3.4–5)
ALBUMIN/GLOB SERPL: 1.1 (ref 0.8–1.7)
ALP SERPL-CCNC: 130 U/L (ref 45–117)
ALT SERPL-CCNC: 13 U/L (ref 13–56)
ANION GAP SERPL CALC-SCNC: 4 MMOL/L (ref 3–18)
APPEARANCE UR: CLEAR
AST SERPL-CCNC: 12 U/L (ref 10–38)
BACTERIA URNS QL MICRO: ABNORMAL /HPF
BASOPHILS # BLD: 0.1 K/UL (ref 0–0.1)
BASOPHILS NFR BLD: 0 % (ref 0–2)
BILIRUB SERPL-MCNC: 0.4 MG/DL (ref 0.2–1)
BILIRUB UR QL: NEGATIVE
BUN SERPL-MCNC: 18 MG/DL (ref 7–18)
BUN/CREAT SERPL: 14 (ref 12–20)
CALCIUM SERPL-MCNC: 9.6 MG/DL (ref 8.5–10.1)
CHLORIDE SERPL-SCNC: 110 MMOL/L (ref 100–111)
CO2 SERPL-SCNC: 26 MMOL/L (ref 21–32)
COLOR UR: YELLOW
CREAT SERPL-MCNC: 1.3 MG/DL (ref 0.6–1.3)
DIFFERENTIAL METHOD BLD: ABNORMAL
EOSINOPHIL # BLD: 0.5 K/UL (ref 0–0.4)
EOSINOPHIL NFR BLD: 4 % (ref 0–5)
EPITH CASTS URNS QL MICRO: ABNORMAL /LPF (ref 0–5)
ERYTHROCYTE [DISTWIDTH] IN BLOOD BY AUTOMATED COUNT: 13.4 % (ref 11.6–14.5)
GLOBULIN SER CALC-MCNC: 3.3 G/DL (ref 2–4)
GLUCOSE SERPL-MCNC: 147 MG/DL (ref 74–99)
GLUCOSE UR STRIP.AUTO-MCNC: NEGATIVE MG/DL
HCT VFR BLD AUTO: 39.3 % (ref 35–45)
HGB BLD-MCNC: 12.8 G/DL (ref 12–16)
HGB UR QL STRIP: NEGATIVE
IMM GRANULOCYTES # BLD AUTO: 0 K/UL (ref 0–0.04)
IMM GRANULOCYTES NFR BLD AUTO: 0 % (ref 0–0.5)
KETONES UR QL STRIP.AUTO: NEGATIVE MG/DL
LEUKOCYTE ESTERASE UR QL STRIP.AUTO: ABNORMAL
LYMPHOCYTES # BLD: 1.9 K/UL (ref 0.9–3.6)
LYMPHOCYTES NFR BLD: 14 % (ref 21–52)
MCH RBC QN AUTO: 27.9 PG (ref 24–34)
MCHC RBC AUTO-ENTMCNC: 32.6 G/DL (ref 31–37)
MCV RBC AUTO: 85.8 FL (ref 78–100)
MONOCYTES # BLD: 0.8 K/UL (ref 0.05–1.2)
MONOCYTES NFR BLD: 6 % (ref 3–10)
MUCOUS THREADS URNS QL MICRO: ABNORMAL /LPF
NEUTS SEG # BLD: 10.3 K/UL (ref 1.8–8)
NEUTS SEG NFR BLD: 76 % (ref 40–73)
NITRITE UR QL STRIP.AUTO: NEGATIVE
NRBC # BLD: 0 K/UL (ref 0–0.01)
NRBC BLD-RTO: 0 PER 100 WBC
NT PRO BNP: 350 PG/ML (ref 0–900)
PH UR STRIP: 6 (ref 5–8)
PLATELET # BLD AUTO: 275 K/UL (ref 135–420)
PMV BLD AUTO: 10.2 FL (ref 9.2–11.8)
POTASSIUM SERPL-SCNC: 4 MMOL/L (ref 3.5–5.5)
PROT SERPL-MCNC: 6.9 G/DL (ref 6.4–8.2)
PROT UR STRIP-MCNC: 100 MG/DL
RBC # BLD AUTO: 4.58 M/UL (ref 4.2–5.3)
RBC #/AREA URNS HPF: ABNORMAL /HPF (ref 0–5)
SODIUM SERPL-SCNC: 140 MMOL/L (ref 136–145)
SP GR UR REFRACTOMETRY: 1.01 (ref 1–1.03)
TROPONIN I SERPL HS-MCNC: 9 NG/L (ref 0–54)
TROPONIN I SERPL HS-MCNC: 9 NG/L (ref 0–54)
UROBILINOGEN UR QL STRIP.AUTO: 0.2 EU/DL (ref 0.2–1)
WBC # BLD AUTO: 13.6 K/UL (ref 4.6–13.2)
WBC URNS QL MICRO: ABNORMAL /HPF (ref 0–4)

## 2024-08-04 PROCEDURE — 99285 EMERGENCY DEPT VISIT HI MDM: CPT

## 2024-08-04 PROCEDURE — 83880 ASSAY OF NATRIURETIC PEPTIDE: CPT

## 2024-08-04 PROCEDURE — 84484 ASSAY OF TROPONIN QUANT: CPT

## 2024-08-04 PROCEDURE — 93010 ELECTROCARDIOGRAM REPORT: CPT | Performed by: INTERNAL MEDICINE

## 2024-08-04 PROCEDURE — 81001 URINALYSIS AUTO W/SCOPE: CPT

## 2024-08-04 PROCEDURE — 94761 N-INVAS EAR/PLS OXIMETRY MLT: CPT

## 2024-08-04 PROCEDURE — 85025 COMPLETE CBC W/AUTO DIFF WBC: CPT

## 2024-08-04 PROCEDURE — 80053 COMPREHEN METABOLIC PANEL: CPT

## 2024-08-04 PROCEDURE — 93005 ELECTROCARDIOGRAM TRACING: CPT | Performed by: EMERGENCY MEDICINE

## 2024-08-04 PROCEDURE — 71045 X-RAY EXAM CHEST 1 VIEW: CPT

## 2024-08-04 RX ORDER — CEPHALEXIN 500 MG/1
500 CAPSULE ORAL 4 TIMES DAILY
Qty: 28 CAPSULE | Refills: 0 | Status: SHIPPED | OUTPATIENT
Start: 2024-08-04 | End: 2024-08-11

## 2024-08-04 NOTE — ED PROVIDER NOTES
patient to be included in the SEP-1 core measure? No Exclusion criteria - the patient is NOT to be included for SEP-1 Core Measure due to: Infection is not suspected    MEDICATIONS ADMINISTERED IN THE ED:  Medications - No data to display         Medical Decision Making     CC/HPI Summary, DDx, ED Course, and Reassessment: The patient is a 73-year-old woman with past medical history as listed above, including coronary artery disease, a AAA status post stenting, and hypertension who was brought to the ED today with chest pain and hypotension.  She took her regular metoprolol and then took a dose of amlodipine since her blood pressure was 223/122 earlier today.  She did have problems taking her first dose of amlodipine several days ago but took it today because her pressure was so high.  Her blood pressure got down to 90/46 and she began to have chest pain at that point.  Her EKG shows normal sinus rhythm without any ischemic changes.  Her first and second troponins are negative.  Her chest pain has resolved once her blood pressure came back up.  Her chest x-ray is negative for pneumonia.    She does have an elevated white blood cell count.  I suspect that it may be secondary to UTI.  The patient is able to tolerate p.o. and otherwise feels well.  She will be discharged home with a prescription for Keflex.    The patient got up to leave and became very dizzy.  We brought her back to her bed in the ED and took down her discharge.  She sat for a moment and felt much better.  She is requesting to leave at this time.  She will be discharged home and will be advised to follow-up with her primary care physician in 2 to 3 days.  Return precautions have been given.              Disposition Considerations (tests considered but not done, Admit vs D/C, Shared Decision Making, Pt Expectation of Test or Tx.): 0       Critical Care Time:     0    Carmen Perez MD    Diagnosis and Disposition     I Carmen Perez MD am the primary

## 2024-08-04 NOTE — ED NOTES
Pt returned after being discharged d/t worsening dizziness in the lobby. Pt is AxOx4, can ambulate, has baseline unsteady gait from prior stroke but is fine walking once she gets started. Pt however, became highly dizzy while sitting in the chair in the waiting room waiting for transportation to arrive.    Pt has been returned to bed 8 for further evaluation.     Bedside report given to Tracie SAWYER.

## 2024-08-04 NOTE — ED TRIAGE NOTES
Came to station with crushing chest pain and felt like she was \"bottoming out.\" BP was 96/50 with 8/10 pressure in chest. Pressure is now 160/90.     Nausea, incontinence.     Clear lung fields, no complaints of pain, dizzy upon standing. History of AAA with stent. Pt is on 50mg of metoprolol. Pt just started amlodipine for the first time today. Had a massive stroke in December with left side deficits. Recently found a benign mass in her chest.

## 2024-08-05 LAB
EKG ATRIAL RATE: 77 BPM
EKG DIAGNOSIS: NORMAL
EKG P AXIS: 68 DEGREES
EKG P-R INTERVAL: 150 MS
EKG Q-T INTERVAL: 418 MS
EKG QRS DURATION: 78 MS
EKG QTC CALCULATION (BAZETT): 473 MS
EKG R AXIS: 18 DEGREES
EKG T AXIS: 38 DEGREES
EKG VENTRICULAR RATE: 77 BPM

## 2024-08-05 NOTE — ED NOTES
Per pt she is feeling much better and wants to leave. MD ok with pt being discharged. Pt ambulated out with steady gait